# Patient Record
Sex: FEMALE | Race: WHITE | NOT HISPANIC OR LATINO | URBAN - METROPOLITAN AREA
[De-identification: names, ages, dates, MRNs, and addresses within clinical notes are randomized per-mention and may not be internally consistent; named-entity substitution may affect disease eponyms.]

---

## 2017-08-25 ENCOUNTER — IMPORTED ENCOUNTER (OUTPATIENT)
Dept: URBAN - METROPOLITAN AREA CLINIC 31 | Facility: CLINIC | Age: 77
End: 2017-08-25

## 2017-08-25 PROBLEM — H01.004: Noted: 2017-08-25

## 2017-08-25 PROBLEM — H01.005: Noted: 2017-08-25

## 2017-08-25 PROBLEM — H04.123: Noted: 2017-08-25

## 2017-08-25 PROBLEM — H01.002: Noted: 2017-08-25

## 2017-08-25 PROBLEM — H01.001: Noted: 2017-08-25

## 2017-08-25 PROCEDURE — 92015 DETERMINE REFRACTIVE STATE: CPT

## 2017-08-25 PROCEDURE — 92014 COMPRE OPH EXAM EST PT 1/>: CPT

## 2017-08-25 NOTE — PATIENT DISCUSSION
1.  Dry Eye OU:  Continue current management with Artificial Tears. No Restasis for now. 2. Blepharitis anterior type OU - The patient exhibits reddened crusty eyelid margins. Warm compresses and lid scrubs were recommended. Antibiotic kaiden to lid margin qhs. Artificial Tears to be used as needed for discomfort.

## 2018-03-16 ENCOUNTER — IMPORTED ENCOUNTER (OUTPATIENT)
Dept: URBAN - METROPOLITAN AREA CLINIC 31 | Facility: CLINIC | Age: 78
End: 2018-03-16

## 2018-03-16 PROBLEM — Z96.1: Noted: 2018-03-16

## 2018-03-16 PROBLEM — H04.123: Noted: 2018-03-16

## 2018-03-16 PROBLEM — H17.89: Noted: 2018-03-16

## 2018-03-16 PROCEDURE — 92014 COMPRE OPH EXAM EST PT 1/>: CPT

## 2018-03-16 NOTE — PATIENT DISCUSSION
1.  Pseudophakia OU - IOLs stable. Monitor. 2. S/P  Lasik: stable3. Dry Eye OU:  Continue current management with Artificial Tears. 4.  Return for an appointment in 12 months for comprehensive exam. with Dr. Batool Vasquez.

## 2019-05-01 ENCOUNTER — IMPORTED ENCOUNTER (OUTPATIENT)
Dept: URBAN - METROPOLITAN AREA CLINIC 31 | Facility: CLINIC | Age: 79
End: 2019-05-01

## 2019-05-01 PROBLEM — Z96.1: Noted: 2019-05-01

## 2019-05-01 PROBLEM — H17.89: Noted: 2019-05-01

## 2019-05-01 PROCEDURE — 92014 COMPRE OPH EXAM EST PT 1/>: CPT

## 2019-05-01 PROCEDURE — 92015 DETERMINE REFRACTIVE STATE: CPT

## 2020-05-29 ENCOUNTER — IMPORTED ENCOUNTER (OUTPATIENT)
Dept: URBAN - METROPOLITAN AREA CLINIC 31 | Facility: CLINIC | Age: 80
End: 2020-05-29

## 2020-05-29 PROBLEM — Z96.1: Noted: 2020-05-29

## 2020-05-29 PROBLEM — H17.89: Noted: 2020-05-29

## 2020-05-29 PROBLEM — H04.123: Noted: 2020-05-29

## 2020-05-29 PROCEDURE — 92014 COMPRE OPH EXAM EST PT 1/>: CPT

## 2021-05-17 ENCOUNTER — IMPORTED ENCOUNTER (OUTPATIENT)
Dept: URBAN - METROPOLITAN AREA CLINIC 31 | Facility: CLINIC | Age: 81
End: 2021-05-17

## 2021-05-17 PROBLEM — Z96.1: Noted: 2021-05-17

## 2021-05-17 PROBLEM — H04.123: Noted: 2021-05-17

## 2021-05-17 PROBLEM — H17.89: Noted: 2021-05-17

## 2021-05-17 PROCEDURE — 92015 DETERMINE REFRACTIVE STATE: CPT

## 2021-05-17 PROCEDURE — 92014 COMPRE OPH EXAM EST PT 1/>: CPT

## 2021-05-17 NOTE — PATIENT DISCUSSION
1.  Pseudophakia OU - IOLs stable. Monitor for changes in vision. 2. S/P  Lasik: monovision glasses for driving. 3. Dry Eye OU:  Continue current management with Artificial Tears. 4.  Return for an appointment in 12 months for comprehensive exam. Topography. with Dr. Reynold Flowers.

## 2021-05-17 NOTE — PATIENT DISCUSSION
Return for an appointment in 12 months for comprehensive exam. Topography. with Dr. Etta Richardson.

## 2021-11-01 ENCOUNTER — OFFICE VISIT (OUTPATIENT)
Age: 81
End: 2021-11-01

## 2022-03-01 ENCOUNTER — OFFICE VISIT (OUTPATIENT)
Age: 82
End: 2022-03-01

## 2022-04-01 ASSESSMENT — TONOMETRY
OD_IOP_MMHG: 13
OD_IOP_MMHG: 13
OS_IOP_MMHG: 13
OS_IOP_MMHG: 11
OD_IOP_MMHG: 10
OS_IOP_MMHG: 10
OD_IOP_MMHG: 13
OS_IOP_MMHG: 12
OD_IOP_MMHG: 11
OS_IOP_MMHG: 14

## 2022-04-01 ASSESSMENT — VISUAL ACUITY
OS_SC: J115''
OS_PH: SC 20/30 -2
OD_CC: 20/30+2
OD_SC: 20/20-2
OS_CC: 20/80-2
OU_SC: 20/20
OD_CC: 20/25
OD_SC: J3
OS_SC: J1+
OS_SC: 20/25-2
OD_SC: 20/25+2
OS_PH: SC 20/40 +2
OS_CC: 20/50
OS_SC: 20/20-1

## 2022-05-27 ENCOUNTER — PREPPED CHART (OUTPATIENT)
Dept: URBAN - METROPOLITAN AREA CLINIC 29 | Facility: CLINIC | Age: 82
End: 2022-05-27

## 2022-05-27 NOTE — PATIENT DISCUSSION
1.  Pseudophakia OU - IOLs stable. Monitor for changes in vision. 2. S/P  Lasik: monovision glasses for driving. 3. Dry Eye OU:  Continue current management with Artificial Tears. 4.  Return for an appointment in 12 months for comprehensive exam. Topography. with Dr. Jaimie Hester.

## 2022-05-31 ENCOUNTER — ESTABLISHED PATIENT (OUTPATIENT)
Dept: URBAN - METROPOLITAN AREA CLINIC 29 | Facility: CLINIC | Age: 82
End: 2022-05-31

## 2022-05-31 DIAGNOSIS — H35.372: ICD-10-CM

## 2022-05-31 DIAGNOSIS — Z98.890: ICD-10-CM

## 2022-05-31 DIAGNOSIS — H43.812: ICD-10-CM

## 2022-05-31 DIAGNOSIS — H04.123: ICD-10-CM

## 2022-05-31 DIAGNOSIS — Z96.1: ICD-10-CM

## 2022-05-31 PROCEDURE — 92025 CPTRIZED CORNEAL TOPOGRAPHY: CPT

## 2022-05-31 PROCEDURE — 92014 COMPRE OPH EXAM EST PT 1/>: CPT

## 2022-05-31 ASSESSMENT — VISUAL ACUITY
OD_SC: 20/20-3
OD_CC: 20/25
OS_SC: 20/20
OS_CC: 20/20

## 2022-05-31 ASSESSMENT — TONOMETRY
OD_IOP_MMHG: 10
OS_IOP_MMHG: 10

## 2022-07-09 ENCOUNTER — TELEPHONE ENCOUNTER (OUTPATIENT)
Dept: URBAN - METROPOLITAN AREA CLINIC 121 | Facility: CLINIC | Age: 82
End: 2022-07-09

## 2022-07-10 ENCOUNTER — TELEPHONE ENCOUNTER (OUTPATIENT)
Dept: URBAN - METROPOLITAN AREA CLINIC 121 | Facility: CLINIC | Age: 82
End: 2022-07-10

## 2022-07-10 RX ORDER — PRAVASTATIN SODIUM 40 MG/1
TABLET ORAL
Refills: 0 | Status: ACTIVE | COMMUNITY
Start: 2012-08-31

## 2022-07-29 ENCOUNTER — OFFICE VISIT (OUTPATIENT)
Dept: URBAN - METROPOLITAN AREA CLINIC 9 | Facility: CLINIC | Age: 82
End: 2022-07-29

## 2022-07-30 ENCOUNTER — TELEPHONE ENCOUNTER (OUTPATIENT)
Age: 82
End: 2022-07-30

## 2022-07-31 ENCOUNTER — TELEPHONE ENCOUNTER (OUTPATIENT)
Age: 82
End: 2022-07-31

## 2022-07-31 RX ORDER — PANTOPRAZOLE SODIUM 40 MG/1
1 TABLET, DELAYED RELEASE ORAL QD
Qty: 0 | Refills: 2 | Status: ACTIVE | COMMUNITY
Start: 2022-07-29

## 2022-09-14 ENCOUNTER — TELEPHONE ENCOUNTER (OUTPATIENT)
Dept: URBAN - METROPOLITAN AREA SURGERY CENTER 9 | Facility: SURGERY CENTER | Age: 82
End: 2022-09-14

## 2022-09-16 ENCOUNTER — OFFICE VISIT (OUTPATIENT)
Dept: URBAN - METROPOLITAN AREA SURGERY CENTER 9 | Facility: SURGERY CENTER | Age: 82
End: 2022-09-16
Payer: MEDICARE

## 2022-09-16 ENCOUNTER — CLAIMS CREATED FROM THE CLAIM WINDOW (OUTPATIENT)
Dept: URBAN - METROPOLITAN AREA CLINIC 8 | Facility: CLINIC | Age: 82
End: 2022-09-16
Payer: MEDICARE

## 2022-09-16 DIAGNOSIS — K22.70 BARRETT ESOPHAGUS: ICD-10-CM

## 2022-09-16 DIAGNOSIS — R12 BURNING REFLUX: ICD-10-CM

## 2022-09-16 DIAGNOSIS — K22.89 DILATATION OF ESOPHAGUS: ICD-10-CM

## 2022-09-16 DIAGNOSIS — K29.50 ANTRAL GASTRITIS: ICD-10-CM

## 2022-09-16 PROCEDURE — 43239 EGD BIOPSY SINGLE/MULTIPLE: CPT | Performed by: CLINIC/CENTER

## 2022-09-16 PROCEDURE — 88313 SPECIAL STAINS GROUP 2: CPT | Performed by: INTERNAL MEDICINE

## 2022-09-16 PROCEDURE — 43239 EGD BIOPSY SINGLE/MULTIPLE: CPT | Performed by: INTERNAL MEDICINE

## 2022-09-16 PROCEDURE — 88312 SPECIAL STAINS GROUP 1: CPT | Performed by: INTERNAL MEDICINE

## 2022-09-16 PROCEDURE — 88305 TISSUE EXAM BY PATHOLOGIST: CPT | Performed by: INTERNAL MEDICINE

## 2022-09-16 RX ORDER — PANTOPRAZOLE SODIUM 40 MG/1
1 TABLET, DELAYED RELEASE ORAL QD
Qty: 0 | Refills: 2 | Status: ACTIVE | COMMUNITY
Start: 2022-07-29

## 2023-06-14 ENCOUNTER — COMPREHENSIVE EXAM (OUTPATIENT)
Dept: URBAN - METROPOLITAN AREA CLINIC 29 | Facility: CLINIC | Age: 83
End: 2023-06-14

## 2023-06-14 DIAGNOSIS — H43.812: ICD-10-CM

## 2023-06-14 DIAGNOSIS — Z98.890: ICD-10-CM

## 2023-06-14 DIAGNOSIS — H04.123: ICD-10-CM

## 2023-06-14 DIAGNOSIS — H35.372: ICD-10-CM

## 2023-06-14 DIAGNOSIS — Z96.1: ICD-10-CM

## 2023-06-14 PROCEDURE — 92015 DETERMINE REFRACTIVE STATE: CPT

## 2023-06-14 PROCEDURE — 92014 COMPRE OPH EXAM EST PT 1/>: CPT

## 2023-06-14 ASSESSMENT — TONOMETRY
OS_IOP_MMHG: 10
OD_IOP_MMHG: 11

## 2023-06-14 ASSESSMENT — VISUAL ACUITY
OU_SC: 20/25
OS_CC: 20/30+2
OD_CC: 20/20

## 2024-06-14 ENCOUNTER — COMPREHENSIVE EXAM (OUTPATIENT)
Dept: URBAN - METROPOLITAN AREA CLINIC 29 | Facility: CLINIC | Age: 84
End: 2024-06-14

## 2024-06-14 DIAGNOSIS — H35.373: ICD-10-CM

## 2024-06-14 DIAGNOSIS — H43.812: ICD-10-CM

## 2024-06-14 DIAGNOSIS — H04.123: ICD-10-CM

## 2024-06-14 DIAGNOSIS — Z96.1: ICD-10-CM

## 2024-06-14 PROCEDURE — 92015 DETERMINE REFRACTIVE STATE: CPT

## 2024-06-14 PROCEDURE — 92134 CPTRZ OPH DX IMG PST SGM RTA: CPT

## 2024-06-14 PROCEDURE — 92014 COMPRE OPH EXAM EST PT 1/>: CPT

## 2024-06-14 ASSESSMENT — TONOMETRY
OS_IOP_MMHG: 11
OD_IOP_MMHG: 11

## 2024-06-14 ASSESSMENT — VISUAL ACUITY
OS_CC: 20/25
OU_CC: J1
OD_CC: 20/20

## 2025-01-30 ENCOUNTER — NEW PATIENT (OUTPATIENT)
Age: 85
End: 2025-01-30

## 2025-01-30 VITALS
SYSTOLIC BLOOD PRESSURE: 141 MMHG | WEIGHT: 160 LBS | HEART RATE: 62 BPM | HEIGHT: 67 IN | BODY MASS INDEX: 25.11 KG/M2 | DIASTOLIC BLOOD PRESSURE: 81 MMHG

## 2025-01-30 DIAGNOSIS — H02.834: ICD-10-CM

## 2025-01-30 DIAGNOSIS — H43.811: ICD-10-CM

## 2025-01-30 DIAGNOSIS — H35.373: ICD-10-CM

## 2025-01-30 DIAGNOSIS — H02.831: ICD-10-CM

## 2025-01-30 DIAGNOSIS — Z98.890: ICD-10-CM

## 2025-01-30 DIAGNOSIS — H43.822: ICD-10-CM

## 2025-01-30 DIAGNOSIS — Z96.1: ICD-10-CM

## 2025-01-30 DIAGNOSIS — H04.123: ICD-10-CM

## 2025-01-30 DIAGNOSIS — D31.32: ICD-10-CM

## 2025-01-30 PROCEDURE — 99204 OFFICE O/P NEW MOD 45 MIN: CPT

## 2025-01-30 PROCEDURE — 92134 CPTRZ OPH DX IMG PST SGM RTA: CPT

## 2025-01-30 PROCEDURE — 92250 FUNDUS PHOTOGRAPHY W/I&R: CPT

## 2025-05-23 ENCOUNTER — COMPREHENSIVE EXAM (OUTPATIENT)
Age: 85
End: 2025-05-23

## 2025-05-23 DIAGNOSIS — H02.831: ICD-10-CM

## 2025-05-23 DIAGNOSIS — H02.834: ICD-10-CM

## 2025-05-23 DIAGNOSIS — H43.811: ICD-10-CM

## 2025-05-23 DIAGNOSIS — Z98.890: ICD-10-CM

## 2025-05-23 DIAGNOSIS — D31.32: ICD-10-CM

## 2025-05-23 DIAGNOSIS — H04.123: ICD-10-CM

## 2025-05-23 DIAGNOSIS — H35.373: ICD-10-CM

## 2025-05-23 DIAGNOSIS — Z96.1: ICD-10-CM

## 2025-05-23 DIAGNOSIS — H43.822: ICD-10-CM

## 2025-05-23 PROCEDURE — 99213 OFFICE O/P EST LOW 20 MIN: CPT

## 2025-05-23 PROCEDURE — 92134 CPTRZ OPH DX IMG PST SGM RTA: CPT

## 2025-05-23 PROCEDURE — 92250 FUNDUS PHOTOGRAPHY W/I&R: CPT

## 2025-06-18 ENCOUNTER — COMPREHENSIVE EXAM (OUTPATIENT)
Age: 85
End: 2025-06-18

## 2025-06-18 DIAGNOSIS — H43.822: ICD-10-CM

## 2025-06-18 DIAGNOSIS — H35.373: ICD-10-CM

## 2025-06-18 DIAGNOSIS — Z96.1: ICD-10-CM

## 2025-06-18 DIAGNOSIS — H04.123: ICD-10-CM

## 2025-06-18 PROCEDURE — 92014 COMPRE OPH EXAM EST PT 1/>: CPT

## 2025-06-18 PROCEDURE — 92015 DETERMINE REFRACTIVE STATE: CPT

## 2025-07-15 ENCOUNTER — CONTACT LENSES/GLASSES VISIT (OUTPATIENT)
Age: 85
End: 2025-07-15

## 2025-07-15 DIAGNOSIS — H04.123: ICD-10-CM

## 2025-07-15 DIAGNOSIS — H35.373: ICD-10-CM

## 2025-07-15 DIAGNOSIS — H43.822: ICD-10-CM

## 2025-07-15 PROCEDURE — 92015GRNC REFRACTION GLASSES RECHECK - NO CHARGE: Mod: NC
